# Patient Record
(demographics unavailable — no encounter records)

---

## 2025-01-15 NOTE — REVIEW OF SYSTEMS
[Anxiety] : anxiety [Under Stress] : under stress [Weight Loss (___ Lbs)] : no recent weight loss [Dyspnea on exertion] : not dyspnea during exertion [Chest Discomfort] : no chest discomfort [Palpitations] : no palpitations

## 2025-01-15 NOTE — REASON FOR VISIT
[Arrhythmia/ECG Abnorrmalities] : arrhythmia/ECG abnormalities [Hyperlipidemia] : hyperlipidemia [Carotid, Aortic and Peripheral Vascular Disease] : carotid, aortic and peripheral vascular disease

## 2025-01-15 NOTE — PHYSICAL EXAM
[No Acute Distress] : no acute distress [Normal S1, S2] : normal S1, S2 [Clear Lung Fields] : clear lung fields [Normal Gait] : normal gait [No Edema] : no edema [Alert and Oriented] : alert and oriented [Good Air Entry] : good air entry [de-identified] : Overweight

## 2025-01-15 NOTE — CARDIOLOGY SUMMARY
[de-identified] : 1/15/2025. Sinus Rhythm  [de-identified] : 3/3/2022.  No ischemic ECG changes with administration of Regadenoson. Normal myocardial perfusion SPECT. Left ventricular systolic function was normal. [de-identified] : 1/28/2022.  Normal left ventricular size and function with estimated ejection fraction 55-60%. Normal right ventricular size and function. Mild tricuspid regurgitation. Trace mitral regurgitation.

## 2025-01-15 NOTE — HISTORY OF PRESENT ILLNESS
[FreeTextEntry1] : Mariam Marinelli is a 67-year-old woman with a history of paroxysmal atrial fibrillation, rate related left bundle branch block, asthma, remote tobacco use, hemochromatosis, and anxiety, who returns for cardiac examination.  She has been feeling well since our last encounter.  She has no new cardiac complaints.  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  She describes a healthy diet.  She has been tolerating prescribed pharmacotherapy.  She admits to continued overconsumption of wine.  *This visit was conducted as part of ongoing, longitudinal medical care for patient's chronic medical diagnoses and other issues.

## 2025-01-15 NOTE — DISCUSSION/SUMMARY
[With Me] : with me [___ Year(s)] : in [unfilled] year(s) [FreeTextEntry1] :  Paroxysmal atrial fibrillation: Remains in sinus rhythm (no arrhythmia recurrence following onset several years ago); continue aspirin and diltiazem.  Left bundle-branch block: Rate-related; normal conduction on today's resting ECG.  Hyperlipidemia: Moderately elevated cholesterol and with a calculated 10-year risk 5.6%; start rosuvastatin 10 mg daily given elevated LDL cholesterol, elevated cardiac risk, and presence of carotid artery plaque.  Prediabetes:I recommended decreasing consumption of alcohol/wine.  Carotid artery plaque: Mild; LDL lowering with rosuvastatin; return for imaging of carotids to assess for interval change.